# Patient Record
Sex: MALE | Race: OTHER | Employment: OTHER | RURAL
[De-identification: names, ages, dates, MRNs, and addresses within clinical notes are randomized per-mention and may not be internally consistent; named-entity substitution may affect disease eponyms.]

---

## 2017-04-11 ENCOUNTER — OFFICE VISIT (OUTPATIENT)
Dept: CARDIOLOGY CLINIC | Age: 77
End: 2017-04-11

## 2017-04-11 VITALS
HEIGHT: 67 IN | BODY MASS INDEX: 23.01 KG/M2 | DIASTOLIC BLOOD PRESSURE: 70 MMHG | RESPIRATION RATE: 18 BRPM | WEIGHT: 146.6 LBS | SYSTOLIC BLOOD PRESSURE: 110 MMHG | OXYGEN SATURATION: 98 % | HEART RATE: 70 BPM

## 2017-04-11 DIAGNOSIS — I25.10 CORONARY ARTERY DISEASE INVOLVING NATIVE CORONARY ARTERY OF NATIVE HEART WITHOUT ANGINA PECTORIS: Primary | ICD-10-CM

## 2017-04-11 DIAGNOSIS — Z95.1 S/P CABG (CORONARY ARTERY BYPASS GRAFT): ICD-10-CM

## 2017-04-11 DIAGNOSIS — E11.9 TYPE 2 DIABETES MELLITUS WITHOUT COMPLICATION, WITHOUT LONG-TERM CURRENT USE OF INSULIN (HCC): ICD-10-CM

## 2017-04-11 DIAGNOSIS — I49.1 PAC (PREMATURE ATRIAL CONTRACTION): ICD-10-CM

## 2017-04-11 DIAGNOSIS — E78.5 DYSLIPIDEMIA: ICD-10-CM

## 2017-04-11 RX ORDER — ACETAMINOPHEN 325 MG/1
TABLET ORAL AS NEEDED
COMMUNITY

## 2017-04-11 RX ORDER — BISMUTH SUBSALICYLATE 262 MG
1 TABLET,CHEWABLE ORAL DAILY
COMMUNITY
End: 2018-11-02 | Stop reason: ALTCHOICE

## 2017-04-11 RX ORDER — OMEPRAZOLE 40 MG/1
40 CAPSULE, DELAYED RELEASE ORAL AS NEEDED
COMMUNITY

## 2017-04-11 RX ORDER — DIPHENHYDRAMINE HCL 25 MG
25 CAPSULE ORAL AS NEEDED
COMMUNITY

## 2017-04-11 NOTE — MR AVS SNAPSHOT
Visit Information Date & Time Provider Department Dept. Phone Encounter #  
 4/11/2017 11:20 AM Juliann Suresh MD [facility] [phone] [id number] Upcoming Health Maintenance Date Due HEMOGLOBIN A1C Q6M 1938 LIPID PANEL Q1 1938 FOOT EXAM Q1 7/24/1948 MICROALBUMIN Q1 7/24/1948 EYE EXAM RETINAL OR DILATED Q1 7/24/1948 DTaP/Tdap/Td series (1 - Tdap) 7/24/1959 ZOSTER VACCINE AGE 60> 7/24/1998 GLAUCOMA SCREENING Q2Y 7/24/2003 Pneumococcal 65+ Low/Medium Risk (1 of 2 - PCV13) 7/24/2003 MEDICARE YEARLY EXAM 7/24/2003 INFLUENZA AGE 9 TO ADULT 8/1/2016 Allergies as of 4/11/2017  Review Complete On: 4/11/2017 By: Juliann Suresh MD  
  
 Severity Noted Reaction Type Reactions Bactrim [Sulfamethoprim]  07/23/2015    Other (comments) Sulfa (Sulfonamide Antibiotics)  05/20/2014    Rash Current Immunizations  Never Reviewed No immunizations on file. Not reviewed this visit You Were Diagnosed With   
  
 Codes Comments Coronary artery disease involving native coronary artery of native heart without angina pectoris    -  Primary ICD-10-CM: I25.10 ICD-9-CM: 414.01 S/P CABG (coronary artery bypass graft)     ICD-10-CM: Z95.1 ICD-9-CM: V45.81 PAC (premature atrial contraction)     ICD-10-CM: I49.1 ICD-9-CM: 427.61 Dyslipidemia     ICD-10-CM: E78.5 ICD-9-CM: 272.4 Type 2 diabetes mellitus without complication, without long-term current use of insulin (HCC)     ICD-10-CM: E11.9 ICD-9-CM: 250.00 Vitals BP Pulse Resp Height(growth percentile) Weight(growth percentile) SpO2  
 110/70 (BP 1 Location: Left arm, BP Patient Position: Sitting) 70 18 5' 7\" (1.702 m) 146 lb 9.6 oz (66.5 kg) 98% BMI Smoking Status 22.96 kg/m2 Never Smoker Vitals History BMI and BSA Data  Body Mass Index Body Surface Area  
 22.96 kg/m 2 1.77 m 2  
  
  
 Preferred Pharmacy Pharm[facility] Phone [facility] [address] [place] [address] Your Updated Medication List  
  
   
This list is accurate as of: 4/11/17 11:59 AM.  Always use your most recent med list.  
  
  
  
  
 ADVAIR DISKUS 250-50 mcg/dose diskus inhaler Generic drug:  fluticasone-salmeterol Take 1 Puff by inhalation as needed. albuterol 90 mcg/actuation inhaler Commonly known as:  PROVENTIL HFA, VENTOLIN HFA, PROAIR HFA Take  by inhalation as needed for Wheezing. aspirin 81 mg chewable tablet Take 81 mg by mouth daily. atorvastatin 80 mg tablet Commonly known as:  LIPITOR Take 80 mg by mouth daily. BENADRYL 25 mg capsule Generic drug:  diphenhydrAMINE Take 25 mg by mouth as needed. CHLORPHENIRAMINE-PHENYLEPHRINE PO Take  by mouth as needed. metFORMIN 500 mg tablet Commonly known as:  GLUCOPHAGE Take  by mouth two (2) times daily (with meals). multivitamin tablet Commonly known as:  ONE A DAY Take 1 Tab by mouth daily. omeprazole 40 mg capsule Commonly known as:  PRILOSEC Take 40 mg by mouth daily. SITagliptin 50 mg tablet Commonly known as:  Jean-Paul Goodpasture Take 25 mg by mouth daily. SYNTHROID 100 mcg tablet Generic drug:  levothyroxine Take  by mouth Daily (before breakfast). TYLENOL 325 mg tablet Generic drug:  acetaminophen Take  by mouth as needed for Pain. We Performed the Following AMB POC EKG ROUTINE W/ 12 LEADS, INTER & REP [58455 CPT(R)] Introducing [facility] & HEALTH SERVICES! Dear Laya Givens: Thank you for requesting a [employer] account. Our records indicate that you already have an active [employer] account. You can access your account anytime at [email or web address] Did you know that you can access your hospital and ER discharge instructions at any time in [employer]?   You can also review all of your test results from your hospital stay or ER visit. Additional Information If you have questions, please visit the Frequently Asked Questions section of the [employer] website at [email or web address] Remember, [employer] is NOT to be used for urgent needs. For medical emergencies, dial 911. Now available from your iPhone and Android! Please provide this summary of care documentation to your next provider. Your primary care clinician is listed as Phys Other. If you have any questions after today's visit, please call [phone].

## 2017-04-11 NOTE — PROGRESS NOTES
Jeffrey Roblero is a 64 y.o. male is here for routine f/u. Remains physically active, running, etc. No CV sx or complaints. DM/lipids doing well, followed by PCP. No CV sx or complaints. The patient denies chest pain/ shortness of breath, orthopnea, PND, LE edema, palpitations, syncope, presyncope or fatigue. Patient Active Problem List    Diagnosis Date Noted    PVC's (premature ventricular contractions) 07/23/2015    PAC (premature atrial contraction) 07/23/2015    CAD (coronary artery disease) 07/23/2015    S/P CABG (coronary artery bypass graft) 07/23/2015    DM (diabetes mellitus) ([facility] 07/23/2015    Dyslipidemia 07/23/2015      Sheridan Coreas MD  Past Medical History:   Diagnosis Date    CAD (coronary artery disease)     [facility]     Dyslipidemia     Endocrine disease     GERD (gastroesophageal reflux disease)     Heart abnormality     Hypothyroidism     Palpitations     S/P CABG (coronary artery bypass graft) 1999    LIMA to LAD, SVT to OM1, OM2, SVG to PDA    Seizure disorder (HCC)     vs syncope w/u 2007 (CT, Echo, carotids, stress    Spinal stenosis     Sun-damaged skin       Past Surgical History:   Procedure Laterality Date    CARDIAC SURG PROCEDURE UNLIST       Allergies   Allergen Reactions    Bactrim [Sulfamethoprim] Other (comments)    Sulfa (Sulfonamide Antibiotics) Rash      Family History   Problem Relation Age of Onset    Diabetes Mother     Stroke Father       Social History     Social History    Marital status:      Spouse name: N/A    Number of children: N/A    Years of education: N/A     Occupational History    Not on file.      Social History Main Topics    Smoking status: Never Smoker    Smokeless tobacco: Never Used    Alcohol use 0.5 oz/week     1 Glasses of wine per week      Comment: one a day    Drug use: No    Sexual activity: Not on file     Other Topics Concern    Not on file     Social History Narrative Current Outpatient Prescriptions   Medication Sig    omeprazole (PRILOSEC) 40 mg capsule Take 40 mg by mouth daily.  CHLORPHENIRAMINE-PHENYLEPHRINE PO Take  by mouth as needed.  diphenhydrAMINE (BENADRYL) 25 mg capsule Take 25 mg by mouth as needed.  acetaminophen (TYLENOL) 325 mg tablet Take  by mouth as needed for Pain.  multivitamin (ONE A DAY) tablet Take 1 Tab by mouth daily.  sitaGLIPtin (JANUVIA) 50 mg tablet Take 25 mg by mouth daily.  atorvastatin (LIPITOR) 80 mg tablet Take 80 mg by mouth daily.  albuterol (PROVENTIL HFA, VENTOLIN HFA, PROAIR HFA) 90 mcg/actuation inhaler Take  by inhalation as needed for Wheezing.  fluticasone-salmeterol (ADVAIR DISKUS) 250-50 mcg/dose diskus inhaler Take 1 Puff by inhalation as needed.  aspirin 81 mg chewable tablet Take 81 mg by mouth daily.  metFORMIN (GLUCOPHAGE) 500 mg tablet Take  by mouth two (2) times daily (with meals).  levothyroxine (SYNTHROID) 100 mcg tablet Take  by mouth Daily (before breakfast). No current facility-administered medications for this visit. Review of Symptoms:    CONST  No weight change. No fever, chills, sweats    ENT No visual changes, URI sx, sore throat    CV  See HPI   RESP  No cough, or sputum, wheezing. Also see HPI   GI  No abdominal pain or change in bowel habits. No heartburn or dysphagia. No melena or rectal bleeding.   No dysuria, urgency, frequency, hematuria   MSKEL  No joint pain, swelling. No muscle pain. SKIN  No rash or lesions. NEURO  No headache, syncope, or seizure. No weakness, loss of sensation, or paresthesias. PSYCH  No low mood or depression  No anxiety. HE/LYMPH  No easy bruising, abnormal bleeding, or enlarged glands.         Physical ExamPhysical Exam:    Visit Vitals    /70 (BP 1 Location: Left arm, BP Patient Position: Sitting)    Pulse 70    Resp 18    Ht 5' 7\" (1.702 m)    Wt 146 lb 9.6 oz (66.5 kg)    SpO2 98%    BMI 22.96 kg/m2     Gen: NAD  HEENT:  PERRL, throat clear  Neck: no mass or thyromegaly, no JVD   Heart:  Regular,Nl S1S2,  no murmur, gallop or rub.   Lungs:  clear  Abdomen:   Soft, non-tender, bowel sounds are active.   Extremities:  No edema  Pulse: symmetric  Neuro: A&O times 3, WNL      Cardiographics    ECG: NSR, RSR', no acute changes    Labs:   No results found for: NA, K, CL, CO2, AGAP, GLU, BUN, CREA, BUCR, GFRAA, GFRNA, CA, TBIL, TBILI, GPT, SGOT, AP, TP, ALB, GLOB, AGRAT, ALT  No results found for: CPK, CPKX, CPX  No results found for: CHOL, CHOLX, CHLST, CHOLV, 889059, HDL, LDL, DLDL, LDLC, DLDLP, TGL, TGLX, TRIGL, [id number], TRIGP, CHHD, CHHDX  No results found for this or any previous visit. Assessment:         Patient Active Problem List    Diagnosis Date Noted    PVC's (premature ventricular contractions) 07/23/2015    PAC (premature atrial contraction) 07/23/2015    CAD (coronary artery disease) 07/23/2015    S/P CABG (coronary artery bypass graft) 07/23/2015    [facility] 07/23/2015    Dyslipidemia 07/23/2015     Remains physically active, running, etc. No CV sx or complaints. DM/lipids doing well, followed by PCP. No CV sx or complaints. Plan:     Doing well with no adverse cardiac symptoms. Lipids and labs followed by PCP. Continue current care and f/u in 6 months.     Robert Silva MD

## 2017-10-13 ENCOUNTER — OFFICE VISIT (OUTPATIENT)
Dept: CARDIOLOGY CLINIC | Age: 77
End: 2017-10-13

## 2017-10-13 VITALS
HEART RATE: 70 BPM | BODY MASS INDEX: 22.91 KG/M2 | RESPIRATION RATE: 18 BRPM | SYSTOLIC BLOOD PRESSURE: 102 MMHG | HEIGHT: 67 IN | OXYGEN SATURATION: 97 % | DIASTOLIC BLOOD PRESSURE: 72 MMHG | WEIGHT: 146 LBS

## 2017-10-13 DIAGNOSIS — Z95.1 S/P CABG (CORONARY ARTERY BYPASS GRAFT): ICD-10-CM

## 2017-10-13 DIAGNOSIS — I49.1 PAC (PREMATURE ATRIAL CONTRACTION): ICD-10-CM

## 2017-10-13 DIAGNOSIS — I49.3 PVC'S (PREMATURE VENTRICULAR CONTRACTIONS): ICD-10-CM

## 2017-10-13 DIAGNOSIS — I25.10 CORONARY ARTERY DISEASE INVOLVING NATIVE CORONARY ARTERY OF NATIVE HEART WITHOUT ANGINA PECTORIS: Primary | ICD-10-CM

## 2017-10-13 DIAGNOSIS — E78.5 DYSLIPIDEMIA: ICD-10-CM

## 2017-10-13 DIAGNOSIS — E11.9 TYPE 2 DIABETES MELLITUS WITHOUT COMPLICATION, WITHOUT LONG-TERM CURRENT USE OF INSULIN (HCC): ICD-10-CM

## 2017-10-13 NOTE — MR AVS SNAPSHOT
Visit Information Date & Time Provider Department Dept. Phone Encounter #  
 10/13/2017 10:20 AM Gaurav Oneal, [address] [place] [address] [facility]iology [facility] [phone] [id number] Follow-up Instructions Return in about 6 months (around 4/13/2018). Follow-up and Disposition History Upcoming Health Maintenance Date Due HEMOGLOBIN A1C Q6M 1938 LIPID PANEL Q1 1938 FOOT EXAM Q1 7/24/1948 MICROALBUMIN Q1 7/24/1948 EYE EXAM RETINAL OR DILATED Q1 7/24/1948 DTaP/Tdap/Td series (1 - Tdap) 7/24/1959 ZOSTER VACCINE AGE 60> 5/24/1998 GLAUCOMA SCREENING Q2Y 7/24/2003 Pneumococcal 65+ Low/Medium Risk (1 of 2 - PCV13) 7/24/2003 MEDICARE YEARLY EXAM 7/24/2003 INFLUENZA AGE 9 TO ADULT 8/1/2017 Allergies as of 10/13/2017  Review Complete On: 10/13/2017 By: Gaurav Oneal MD  
  
 Severity Noted Reaction Type Reactions Bactrim [Sulfamethoprim]  07/23/2015    Other (comments) Sulfa (Sulfonamide Antibiotics)  05/20/2014    Rash Current Immunizations  Never Reviewed No immunizations on file. Not reviewed this visit You Were Diagnosed With   
  
 Codes Comments Coronary artery disease involving native coronary artery of native heart without angina pectoris    -  Primary ICD-10-CM: I25.10 ICD-9-CM: 414.01 S/P CABG (coronary artery bypass graft)     ICD-10-CM: Z95.1 ICD-9-CM: V45.81 PVC's (premature ventricular contractions)     ICD-10-CM: I49.3 ICD-9-CM: 427.69 PAC (premature atrial contraction)     ICD-10-CM: I49.1 ICD-9-CM: 427.61 Type 2 diabetes mellitus without complication, without long-term current use of insulin (HCC)     ICD-10-CM: E11.9 ICD-9-CM: 250.00 Dyslipidemia     ICD-10-CM: E78.5 ICD-9-CM: 272.4 Vitals  BP Pulse Resp Height(growth percentile) Weight(growth percentile) SpO2  
 102/72 (BP 1 Location: Left arm, BP Patient Position: Sitting) 70 18 5' 7\" (1.702 m) 146 lb (66.2 kg) 97% BMI Smoking Status 22.87 kg/m2 Never Smoker Vitals History BMI and BSA Data Body Mass Index Body Surface Area  
 22.87 kg/m 2 1.77 m 2 Preferred Pharmacy Pharmacy Name Phone [facility] [address] [facility], [address] [place] [address] Street  Your Updated Medication List  
  
   
This list is accurate as of: 10/13/17 11:26 AM.  Always use your most recent med list.  
  
  
  
  
 Dhara Friend 250-50 mcg/dose diskus inhaler Generic drug:  fluticasone-salmeterol Take 1 Puff by inhalation as needed. albuterol 90 mcg/actuation inhaler Commonly known as:  PROVENTIL HFA, VENTOLIN HFA, PROAIR HFA Take  by inhalation as needed for Wheezing. aspirin 81 mg chewable tablet Take 81 mg by mouth daily. atorvastatin 80 mg tablet Commonly known as:  LIPITOR Take 80 mg by mouth daily. BENADRYL 25 mg capsule Generic drug:  diphenhydrAMINE Take 25 mg by mouth as needed. metFORMIN 500 mg tablet Commonly known as:  GLUCOPHAGE Take  by mouth two (2) times daily (with meals). multivitamin tablet Commonly known as:  ONE A DAY Take 1 Tab by mouth daily. omeprazole 40 mg capsule Commonly known as:  PRILOSEC Take 40 mg by mouth daily. SITagliptin 50 mg tablet Commonly known as:  Carmen Pierini Take 50 mg by mouth daily. SYNTHROID 100 mcg tablet Generic drug:  levothyroxine Take  by mouth Daily (before breakfast). TYLENOL 325 mg tablet Generic drug:  acetaminophen Take  by mouth as needed for Pain. We Performed the Following AMB POC EKG ROUTINE W/ 12 LEADS, INTER & REP [49759 CPT(R)] Follow-up Instructions Return in about 6 months (around 4/13/2018). To-Do List   
 Around 10/17/2017 ECG:  STRESS TEST CARDIAC Introducing [facility] & HEALTH SERVICES! Dear Duc Beltrán: Thank you for requesting a [employer] account. Our records indicate that you already have an active [employer] account. You can access your account anytime at [email or web address] Did you know that you can access your hospital and ER discharge instructions at any time in [employer]? You can also review all of your test results from your hospital stay or ER visit. Additional Information If you have questions, please visit the Frequently Asked Questions section of the [employer] website at [email or web address] Remember, [employer] is NOT to be used for urgent needs. For medical emergencies, dial 911. Now available from your iPhone and Android! Please provide this summary of care documentation to your next provider. Your primary care clinician is listed as Phys Other. If you have any questions after today's visit, please call [phone].

## 2017-10-13 NOTE — PROGRESS NOTES
Verified patient with two patient identifiers. Medications reviewed/approved by Dr. Zheng Rodriges. Verbal from Dr. Zheng Rodriges to remove the medications that were deleted during the visit.

## 2017-10-13 NOTE — PROGRESS NOTES
Lacy Davila is a 76 y.o. male is here for routine f/u. Hx CAD, s/p CABG x4 1999, DM, dyslipidemia with recent sx of palpitations over past month. Holter with PAC's, PVC's, short runs (no afib or block). Stress MPI 9/15 with no infarct/ischemia, LVEF 53%. Echo 8/15 with LVEF 60-65, valves ok (mild MR). No CV sx or complaints. Physically active--runs, etc.  DM and lipids followed by PCP. The patient denies chest pain/ shortness of breath, orthopnea, PND, LE edema, palpitations, syncope, presyncope or fatigue. Patient Active Problem List    Diagnosis Date Noted    PVC's (premature ventricular contractions) 07/23/2015    PAC (premature atrial contraction) 07/23/2015    CAD (coronary artery disease) 07/23/2015    S/P CABG (coronary artery bypass graft) 07/23/2015    DM (di[facility]) ([facility] 07/23/2015    Dyslipidemia 07/23/2015      Sheridan Coreas MD  Past Medical History:   Diagnosis Date    CAD (coronary artery disease)     DM (diabetes mellitus) ([facility]     Dyslipidemia     Endocrine disease     GERD (gastroesophageal reflux disease)     Heart abnormality     Hypothyroidism     Palpitations     S/P CABG (coronary artery bypass graft) 1999    LIMA to LAD, SVT to OM1, OM2, SVG to PDA    Seizure disorder (HCC)     vs syncope w/u 2007 (CT, Echo, carotids, stress    Spinal stenosis     Sun-damaged skin       Past Surgical History:   Procedure Laterality Date    CARDIAC SURG PROCEDURE UNLIST       Allergies   Allergen Reactions    Bactrim [Sulfamethoprim] Other (comments)    Sulfa (Sulfonamide Antibiotics) Rash      Family History   Problem Relation Age of Onset    Diabetes Mother     Stroke Father       Social History     Social History    Marital status:      Spouse name: N/A    Number of children: N/A    Years of education: N/A     Occupational History    Not on file.      Social History Main Topics    Smoking status: Never Smoker    Smokeless tobacco: Never Used    Alcohol use 0.5 oz/week     1 Glasses of wine per week      Comment: one a day    Drug use: No    Sexual activity: Not on file     Other Topics Concern    Not on file     Social History Narrative      Current Outpatient Prescriptions   Medication Sig    omeprazole (PRILOSEC) 40 mg capsule Take 40 mg by mouth daily.  diphenhydrAMINE (BENADRYL) 25 mg capsule Take 25 mg by mouth as needed.  acetaminophen (TYLENOL) 325 mg tablet Take  by mouth as needed for Pain.  multivitamin (ONE A DAY) tablet Take 1 Tab by mouth daily.  sitaGLIPtin (JANUVIA) 50 mg tablet Take 50 mg by mouth daily.  atorvastatin (LIPITOR) 80 mg tablet Take 80 mg by mouth daily.  albuterol (PROVENTIL HFA, VENTOLIN HFA, PROAIR HFA) 90 mcg/actuation inhaler Take  by inhalation as needed for Wheezing.  fluticasone-salmeterol (ADVAIR DISKUS) 250-50 mcg/dose diskus inhaler Take 1 Puff by inhalation as needed.  aspirin 81 mg chewable tablet Take 81 mg by mouth daily.  metFORMIN (GLUCOPHAGE) 500 mg tablet Take  by mouth two (2) times daily (with meals).  levothyroxine (SYNTHROID) 100 mcg tablet Take  by mouth Daily (before breakfast). No current facility-administered medications for this visit. Review of Symptoms:    CONST  No weight change. No fever, chills, sweats    ENT No visual changes, URI sx, sore throat    CV  See HPI   RESP  No cough, or sputum, wheezing. Also see HPI   GI  No abdominal pain or change in bowel habits. No heartburn or dysphagia. No melena or rectal bleeding.   No dysuria, urgency, frequency, hematuria   MSKEL  No joint pain, swelling. No muscle pain. SKIN  No rash or lesions. NEURO  No headache, syncope, or seizure. No weakness, loss of sensation, or paresthesias. PSYCH  No low mood or depression  No anxiety. HE/LYMPH  No easy bruising, abnormal bleeding, or enlarged glands.         Physical ExamPhysical Exam:    Visit Vitals    /72 (BP 1 Location: Left arm, BP Patient Position: Sitting)    Pulse 70    Resp 18    Ht 5' 7\" (1.702 m)    Wt 146 lb (66.2 kg)    SpO2 97%    BMI 22.87 kg/m2     Gen: NAD  HEENT:  PERRL, throat clear  Neck: no adenopathy, no thyromegaly, no JVD   Heart:  Regular,Nl S1S2,  no murmur, gallop or rub.   Lungs:  Clear healed sternotomy  Abdomen:   Soft, non-tender, bowel sounds are active.   Extremities:  No edema  Pulse: symmetric  Neuro: A&O times 3, No focal neuro deficits    Cardiographics    ECG: NSR, RSR', NST, no changes    CARDIAC TESTING:    ECHO 8/10/15: Normal LV size/wall thickness, LVEF 60-65, mild MR, PASP<35    STRESS MYOVIEW: 8/10/15 no ischemia/infarct, LVEF 53%      Assessment:         Patient Active Problem List    Diagnosis Date Noted    PVC's (premature ventricular contractions) 07/23/2015    PAC (premature atrial contraction) 07/23/2015    CAD (coronary artery disease) 07/23/2015    S/P CABG (coronary artery bypass graft) 07/23/2015    DM (diabetes mellitus) ([facility] 07/23/2015    Dyslipidemia 07/23/2015     Hx CAD, s/p CABG x4 1999, DM, dyslipidemia with recent sx of palpitations over past month. Holter with PAC's, PVC's, short runs (no afib or block). Stress MPI 9/15 with no infarct/ischemia, LVEF 53%. Echo 8/15 with LVEF 60-65, mild MR, otherwise ok. No CV sx or complaints. Physically active--runs, etc.  DM and lipids followed by PCP. Plan:     Doing well with no adverse cardiac symptoms. Last Stress test 2015--plan repeat Exercise stress treadmill--call w/ results. Lipids and labs followed by PCP. Continue current care and f/u in 6 months.     Radu Mathis MD

## 2018-04-27 ENCOUNTER — OFFICE VISIT (OUTPATIENT)
Dept: CARDIOLOGY CLINIC | Age: 78
End: 2018-04-27

## 2018-04-27 VITALS
SYSTOLIC BLOOD PRESSURE: 128 MMHG | HEART RATE: 80 BPM | HEIGHT: 67 IN | BODY MASS INDEX: 22.76 KG/M2 | WEIGHT: 145 LBS | OXYGEN SATURATION: 95 % | RESPIRATION RATE: 14 BRPM | DIASTOLIC BLOOD PRESSURE: 74 MMHG

## 2018-04-27 DIAGNOSIS — I25.10 CORONARY ARTERY DISEASE INVOLVING NATIVE CORONARY ARTERY OF NATIVE HEART WITHOUT ANGINA PECTORIS: Primary | ICD-10-CM

## 2018-04-27 DIAGNOSIS — I49.3 PVC'S (PREMATURE VENTRICULAR CONTRACTIONS): ICD-10-CM

## 2018-04-27 DIAGNOSIS — E78.5 DYSLIPIDEMIA: ICD-10-CM

## 2018-04-27 DIAGNOSIS — I49.1 PAC (PREMATURE ATRIAL CONTRACTION): ICD-10-CM

## 2018-04-27 DIAGNOSIS — Z95.1 S/P CABG (CORONARY ARTERY BYPASS GRAFT): ICD-10-CM

## 2018-04-27 DIAGNOSIS — E11.9 TYPE 2 DIABETES MELLITUS WITHOUT COMPLICATION, WITHOUT LONG-TERM CURRENT USE OF INSULIN (HCC): ICD-10-CM

## 2018-04-27 RX ORDER — DOXYCYCLINE 100 MG/1
100 CAPSULE ORAL 2 TIMES DAILY
COMMUNITY
End: 2018-11-02 | Stop reason: ALTCHOICE

## 2018-04-27 NOTE — MR AVS SNAPSHOT
[address] 
 
 
 [address] [facility] [id number] [phone] Patient: Eda Whelan MRN: [id number] Visit Information Date & Time Provider Department Dept. Phone Encounter #  
 4/27/2018 10:40 AM Benjamin Hall, [address] [facility] [id number] Your Appointments 11/2/2018 10:40 AM  
ESTABLISHED PATIENT with Benjamin Hall MD  
[address] [facility]) Appt Note: 6 mo fu $0cp [address] [facility] [address] [id number] [phone]  
  
   
 [address] [place] [address] Upcoming Health Maintenance Date Due HEMOGLOBIN A1C Q6M 1940 LIPID PANEL Q1 1938 FOOT EXAM Q1 7/24/1948 MICROALBUMIN Q1 7/24/1948 EYE EXAM RETINAL OR DILATED Q1 7/24/1948 DTaP/Tdap/Td series (1 - Tdap) 7/24/1959 ZOSTER VACCINE AGE 60> 5/24/1998 GLAUCOMA SCREENING Q2Y 7/24/2003 Pneumococcal 65+ Low/Medium Risk (1 of 2 - PCV13) 7/24/2003 MEDICARE YEARLY EXAM 3/14/2018 Influenza Age 5 to Adult 8/1/2018 Allergies as of 4/27/2018  Review Complete On: 4/27/2018 By: Benjamin Hall MD  
  
 Severity Noted Reaction Type Reactions Bactrim [Sulfamethoprim]  07/23/2015    Other (comments) Sulfa (Sulfonamide Antibiotics)  05/20/2014    Rash Current Immunizations  Never Reviewed No immunizations on file. Not reviewed this visit You Were Diagnosed With   
  
 Codes Comments Coronary artery disease involving native coronary artery of native heart without angina pectoris    -  Primary ICD-10-CM: I25.10 ICD-9-CM: 414.01   
 PVC's (premature ventricular contractions)     ICD-10-CM: I49.3 ICD-9-CM: 427.69 PAC (premature atrial contraction)     ICD-10-CM: I49.1 ICD-9-CM: 427.61 Dyslipidemia     ICD-10-CM: E78.5 ICD-9-CM: 272.4 S/P CABG (coronary artery bypass graft)     ICD-10-CM: Z95.1 ICD-9-CM: V45.81 Type 2 diabetes mellitus without complication, without long-term current use of insulin (HCC)     ICD-10-CM: E11.9 ICD-9-CM: 250.00 Vitals BP Pulse Resp Height(growth percentile) Weight(growth percentile) SpO2  
 128/74 (BP 1 Location: Right arm, BP Patient Position: Sitting) 80 14 5' 7\" (1.702 m) 145 lb (65.8 kg) 95% BMI Smoking Status 22.71 kg/m2 Never Smoker BMI and BSA Data Body Mass Index Body Surface Area  
 22.71 kg/m 2 1.76 m 2 Preferred Pharmacy Pharmacy Name Phone [facility] [id number] [facility], [address] [place] [address] Your Updated Medication List  
  
   
This list is accurate as of 4/27/18 11:07 AM.  Always use your most recent med list.  
  
  
  
  
 Lenward Leaver 250-50 mcg/dose diskus inhaler Generic drug:  fluticasone-salmeterol Take 1 Puff by inhalation as needed. albuterol 90 mcg/actuation inhaler Commonly known as:  PROVENTIL HFA, VENTOLIN HFA, PROAIR HFA Take  by inhalation as needed for Wheezing. aspirin 81 mg chewable tablet Take 81 mg by mouth daily. atorvastatin 80 mg tablet Commonly known as:  LIPITOR Take 80 mg by mouth daily. BENADRYL 25 mg capsule Generic drug:  diphenhydrAMINE Take 25 mg by mouth as needed. doxycycline 100 mg capsule Commonly known as:  Arlyss Mantel Take 100 mg by mouth two (2) times a day. metFORMIN 500 mg tablet Commonly known as:  GLUCOPHAGE Take  by mouth two (2) times daily (with meals). multivitamin tablet Commonly known as:  ONE A DAY Take 1 Tab by mouth daily. omeprazole 40 mg capsule Commonly known as:  PRILOSEC Take 40 mg by mouth every Monday, Wednesday, Friday. SITagliptin 50 mg tablet Commonly known as:  Jacki Seat Take 50 mg by mouth daily. SYNTHROID 100 mcg tablet Generic drug:  levothyroxine Take  by mouth Daily (before breakfast). TYLENOL 325 mg tablet Generic drug:  acetaminophen Take  by mouth as needed for Pain. We Performed the Following AMB POC EKG ROUTINE W/ 12 LEADS, INTER & REP [84766 CPT(R)] Introducing [facility] [place][note 2]! Dear Moreno Boston: Thank you for requesting a [employer] account. Our records indicate that you already have an active [employer] account. You can access your account anytime at [email or web address] Did you know that you can access your hospital and ER discharge instructions at any time in [employer]izer? You can also review all of your test results from your hospital stay or ER visit. Additional Information If you have questions, please visit the Frequently Asked Questions section of the [employer] website at [email or web address] Remember, Gre[employer]izer is NOT to be used for urgent needs. For medical emergencies, dial 911. Now available from your iPhone and Android! Please provide this summary of care documentation to your next provider. Your primary care clinician is listed as Phys Other. If you have any questions after today's visit, please call [phone].

## 2018-04-27 NOTE — PROGRESS NOTES
Daija Renteria is a 76 y.o. male is here for routine f/u. Hx CAD, s/p CABG x4 1999, DM, dyslipidemia with recent sx of palpitations over past month.  Holter with PAC's, PVC's, short runs (no afib or block). Stress MPI 9/15 with no infarct/ischemia, LVEF 53%. Echo 8/15 with LVEF 60-65, valves ok (mild MR). Stress treadmill EKG 10/17 was normal.  No CV sx or complaints. Physically active--runs, etc.  DM and lipids followed by PCP. The patient denies chest pain/ shortness of breath, orthopnea, PND, LE edema, palpitations, syncope, presyncope or fatigue. Patient Active Problem List    Diagnosis Date Noted    PVC's (premature ventricular contractions) 07/23/2015    PAC (premature atrial contraction) 07/23/2015    CAD (coronary artery disease) 07/23/2015    S/P CABG (coronary artery bypass graft) 07/23/2015    DM [facility]diabetes mellitus) ([facility] 07/23/2015    Dyslipidemia 07/23/2015      Sheridan Coreas MD  Past Medical History:   Diagnosis Date    CAD (coronary artery disease)     DM [facility]diabetes mellitus) ([facility]     Dyslipidemia     Endocrine disease     GERD (gastroesophageal reflux disease)     Heart abnormality     Hypothyroidism     Palpitations     S/P CABG (coronary artery bypass graft) 1999    LIMA to LAD, SVT to OM1, OM2, SVG to PDA    Seizure disorder (HCC)     vs syncope w/u 2007 (CT, Echo, carotids, stress    Spinal stenosis     Sun-damaged skin       Past Surgical History:   Procedure Laterality Date    CARDIAC SURG PROCEDURE UNLIST       Allergies   Allergen Reactions    Bactrim [Sulfamethoprim] Other (comments)    Sulfa (Sulfonamide Antibiotics) Rash      Family History   Problem Relation Age of Onset    Diabetes Mother     Stroke Father       Social History     Social History    Marital status:      Spouse name: N/A    Number of children: N/A    Years of education: N/A     Occupational History    Not on file.      Social History Main Topics    Smoking status: Never Smoker    Smokeless tobacco: Never Used    Alcohol use 0.5 oz/week     1 Glasses of wine per week      Comment: one a day    Drug use: No    Sexual activity: Not on file     Other Topics Concern    Not on file     Social History Narrative      Current Outpatient Prescriptions   Medication Sig    doxycycline (MONODOX) 100 mg capsule Take 100 mg by mouth two (2) times a day.  omeprazole (PRILOSEC) 40 mg capsule Take 40 mg by mouth every Monday, Wednesday, Friday.  diphenhydrAMINE (BENADRYL) 25 mg capsule Take 25 mg by mouth as needed.  acetaminophen (TYLENOL) 325 mg tablet Take  by mouth as needed for Pain.  multivitamin (ONE A DAY) tablet Take 1 Tab by mouth daily.  sitaGLIPtin (JANUVIA) 50 mg tablet Take 50 mg by mouth daily.  atorvastatin (LIPITOR) 80 mg tablet Take 80 mg by mouth daily.  albuterol (PROVENTIL HFA, VENTOLIN HFA, PROAIR HFA) 90 mcg/actuation inhaler Take  by inhalation as needed for Wheezing.  fluticasone-salmeterol (ADVAIR DISKUS) 250-50 mcg/dose diskus inhaler Take 1 Puff by inhalation as needed.  aspirin 81 mg chewable tablet Take 81 mg by mouth daily.  metFORMIN (GLUCOPHAGE) 500 mg tablet Take  by mouth two (2) times daily (with meals).  levothyroxine (SYNTHROID) 100 mcg tablet Take  by mouth Daily (before breakfast). No current facility-administered medications for this visit. Review of Symptoms:    CONST  No weight change. No fever, chills, sweats    ENT No visual changes, URI sx, sore throat    CV  See HPI   RESP  No cough, or sputum, wheezing. Also see HPI   GI  No abdominal pain or change in bowel habits. No heartburn or dysphagia. No melena or rectal bleeding.   No dysuria, urgency, frequency, hematuria   MSKEL  No joint pain, swelling. No muscle pain. SKIN  No rash or lesions. NEURO  No headache, syncope, or seizure. No weakness, loss of sensation, or paresthesias. PSYCH  No low mood or depression  No anxiety. HE/LYMPH  No easy bruising, abnormal bleeding, or enlarged glands. Physical ExamPhysical Exam:    Visit Vitals    /74 (BP 1 Location: Right arm, BP Patient Position: Sitting)    Pulse 80    Resp 14    Ht 5' 7\" (1.702 m)    Wt 145 lb (65.8 kg)    SpO2 95%    BMI 22.71 kg/m2     Gen: NAD  HEENT:  PERRL, throat clear  Neck: no adenopathy, no thyromegaly, no JVD   Heart:  Regular,Nl S1S2,  no murmur, gallop or rub.   Lungs:  Clear healed sternotomy  Abdomen:   Soft, non-tender, bowel sounds are active.   Extremities:  No edema  Pulse: symmetric  Neuro: A&O times 3, No focal neuro deficits    Cardiographics    ECG: NSR, RSR', NST, no change from previous      Assessment:         Patient Active Problem List    Diagnosis Date Noted    PVC's (premature ventricular contractions) 07/23/2015    PAC (premature atrial contraction) 07/23/2015    CAD (coronary artery disease) 07/23/2015    S/P CABG (coronary artery bypass graft) 07/23/2015    DM (diabetes mellitus) ([facility] 07/23/2015    Dyslipidemia 07/23/2015     Hx CAD, s/p CABG x4 1999, DM, dyslipidemia with recent sx of palpitations over past month.  Holter with PAC's, PVC's, short runs (no afib or block). Stress MPI 9/15 with no infarct/ischemia, LVEF 53%. Echo 8/15 with LVEF 60-65, valves ok (mild MR). Stress treadmill EKG 10/17 was normal.  No CV sx or complaints. Physically active--runs, etc.  DM and lipids followed by PCP. Plan:     Doing well with no adverse cardiac symptoms. Lipids and labs followed by PCP. Continue current care and f/u in 6 months.     Malgorzata Higgins MD

## 2018-04-27 NOTE — PROGRESS NOTES
PATIENT ID VERIFIED WITH TWO PATIENT IDENTIFIERS. PATIENT MEDICATIONS REVIEWED AND APPROVED BY DR. Catherine Lama. MEDICATIONS THAT WERE REMOVED FROM THIS VISIT HAVE BEEN APPROVED BY DR. Catherine Lama. Chief Complaint   Patient presents with    Coronary Artery Disease     6 MO F/U    Irregular Heart Beat    Cholesterol Problem        1. Have you been to the ER, urgent care clinic since your last visit? Hospitalized since your last visit? NO    2. Have you seen or consulted any other health care providers outside of the [address] [place] [address] since your last visit? YES PCP Dr. Isael Smith with [place], Dermatology-[facility] Galarza.  [facility]

## 2018-11-02 ENCOUNTER — OFFICE VISIT (OUTPATIENT)
Dept: CARDIOLOGY CLINIC | Age: 78
End: 2018-11-02

## 2018-11-02 VITALS
DIASTOLIC BLOOD PRESSURE: 70 MMHG | SYSTOLIC BLOOD PRESSURE: 134 MMHG | OXYGEN SATURATION: 96 % | HEIGHT: 67 IN | RESPIRATION RATE: 12 BRPM | WEIGHT: 145 LBS | HEART RATE: 85 BPM | BODY MASS INDEX: 22.76 KG/M2

## 2018-11-02 DIAGNOSIS — I49.3 PVC'S (PREMATURE VENTRICULAR CONTRACTIONS): ICD-10-CM

## 2018-11-02 DIAGNOSIS — I49.9 IRREGULAR HEARTBEAT: ICD-10-CM

## 2018-11-02 DIAGNOSIS — Z95.1 S/P CABG (CORONARY ARTERY BYPASS GRAFT): ICD-10-CM

## 2018-11-02 DIAGNOSIS — E78.5 DYSLIPIDEMIA: ICD-10-CM

## 2018-11-02 DIAGNOSIS — I25.10 CORONARY ARTERY DISEASE INVOLVING NATIVE CORONARY ARTERY OF NATIVE HEART WITHOUT ANGINA PECTORIS: Primary | ICD-10-CM

## 2018-11-02 DIAGNOSIS — E11.9 TYPE 2 DIABETES MELLITUS WITHOUT COMPLICATION, WITHOUT LONG-TERM CURRENT USE OF INSULIN (HCC): ICD-10-CM

## 2018-11-02 RX ORDER — FLUOCINONIDE 0.5 MG/G
CREAM TOPICAL AS NEEDED
COMMUNITY

## 2018-11-02 NOTE — PROGRESS NOTES
Mely Dowell is a [de-identified] y.o. male is here for routine f/u. Hx CAD, s/p CABG x4 1999, DM, dyslipidemia with recent sx of palpitations over past month.  Holter with PAC's, PVC's, short runs (no afib or block).  Stress MPI 9/15 with no infarct/ischemia, LVEF 53%.  Echo 8/15 with LVEF 60-65, valves ok (mild MR). Stress treadmill EKG 10/17 was normal.  No CV sx or complaints.  Physically active--runs, etc.  DM and lipids followed by PCP. No CV sx or complaints. Continues to be physically active, running, etc.  The patient denies chest pain/ shortness of breath, orthopnea, PND, LE edema, palpitations, syncope, presyncope or fatigue. Patient Active Problem List  
 Diagnosis Date Noted  PVC's (premature ventricular contractions) 07/23/2015  PAC (premature atrial contraction) 07/23/2015  CAD (coronary artery disease) 07/23/2015  S/P CABG (coronary artery bypass graft) 07/23/2015  DM (diabetes mellitus) ([facility] [facility] 07/23/2015  Dyslipidemia 07/23/2015 Other, MD Sheridan 
Past Medical History:  
Diagnosis Date  CAD (coronary artery disease)  DM (diabetes mellitus) ([facility]  Dyslipidemia  Endocrine disease  GERD (gastroesophageal reflux disease)  Heart abnormality  Hypothyroidism  Palpitations  S/P CABG (coronary artery bypass graft) 1999 LIMA to LAD, SVT to OM1, OM2, SVG to PDA  Seizure disorder ([facility]   
 vs syncope w/u 2007 (CT, Echo, carotids, stress  Spinal stenosis  Sun-damaged skin Past Surgical History:  
Procedure Laterality Date  CARDIAC SURG PROCEDURE UNLIST Allergies Allergen Reactions  Bactrim [Sulfamethoprim] Other (comments)  Sulfa (Sulfonamide Antibiotics) Rash Family History Problem Relation Age of Onset  Diabetes Mother  Stroke Father Social History Socioeconomic History  Marital status:  Spouse name: Not on file  Number of children: Not on file  Years of education: Not on file  Highest education level: Not on file Social Needs  Financial resource strain: Not on file  Food insecurity - worry: Not on file  Food insecurity - inability: Not on file  Transportation needs - medical: Not on file  Transportation needs - non-medical: Not on file Occupational History  Not on file Tobacco Use  Smoking status: Never Smoker  Smokeless tobacco: Never Used Substance and Sexual Activity  Alcohol use: Yes Alcohol/week: 0.5 oz Types: 1 Glasses of wine per week Comment: one a day  Drug use: No  
 Sexual activity: Not on file Other Topics Concern  Not on file Social History Narrative  Not on file Current Outpatient Medications Medication Sig  
 omeprazole (PRILOSEC) 40 mg capsule Take 40 mg by mouth every Monday, Wednesday, Friday.  diphenhydrAMINE (BENADRYL) 25 mg capsule Take 25 mg by mouth as needed.  acetaminophen (TYLENOL) 325 mg tablet Take  by mouth as needed for Pain.  sitaGLIPtin (JANUVIA) 50 mg tablet Take 50 mg by mouth daily.  atorvastatin (LIPITOR) 80 mg tablet Take 80 mg by mouth daily.  albuterol (PROVENTIL HFA, VENTOLIN HFA, PROAIR HFA) 90 mcg/actuation inhaler Take  by inhalation as needed for Wheezing.  fluticasone-salmeterol (ADVAIR DISKUS) 250-50 mcg/dose diskus inhaler Take 1 Puff by inhalation as needed.  aspirin 81 mg chewable tablet Take 81 mg by mouth daily.  metFORMIN (GLUCOPHAGE) 500 mg tablet Take  by mouth two (2) times daily (with meals).  levothyroxine (SYNTHROID) 100 mcg tablet Take  by mouth Daily (before breakfast). No current facility-administered medications for this visit. Review of Symptoms: 
 
CONST  No weight change. No fever, chills, sweats ENT No visual changes, URI sx, sore throat CV  See HPI  
RESP  No cough, or sputum, wheezing. Also see HPI  
GI  No abdominal pain or change in bowel habits. No heartburn or dysphagia. No melena or rectal bleeding.   No dysuria, urgency, frequency, hematuria MSKEL  No joint pain, swelling. No muscle pain. SKIN  No rash or lesions. NEURO  No headache, syncope, or seizure. No weakness, loss of sensation, or paresthesias. PSYCH  No low mood or depression No anxiety. HE/LYMPH  No easy bruising, abnormal bleeding, or enlarged glands. Physical ExamPhysical Exam:   
Visit Vitals /70 (BP 1 Location: Right arm, BP Patient Position: Sitting) Pulse 85 Resp 12 Ht 5' 7\" (1.702 m) Wt 145 lb (65.8 kg) SpO2 96% BMI 22.71 kg/m² Gen: NAD HEENT:  PERRL, throat clear Neck: no adenopathy, no thyromegaly, no JVD Heart:  Regular,Nl S1S2,  I/VI murmur, no gallop or rub.  
Lungs:  Clear healed sternotomy Abdomen:   Soft, non-tender, bowel sounds are active.  
Extremities:  No edema Pulse: symmetric Neuro: A&O times 3, No focal neuro deficits Cardiographics ECG: NSR, RSR', NST, no acute changes Assessment:  
  
  
Patient Active Problem List  
 Diagnosis Date Noted  PVC's (premature ventricular contractions) 07/23/2015  PAC (premature atrial contraction) 07/23/2015  CAD (coronary artery disease) 07/23/2015  S/P CABG (coronary artery bypass graft) 07/23/2015  DM (diabetes mellitus) ([facility] [address] 07/23/2015  Dyslipidemia 07/23/2015 Hx CAD, s/p CABG x4 1999, DM, dyslipidemia with recent sx of palpitations over past month.  Holter with PAC's, PVC's, short runs (no afib or block).  Stress MPI 9/15 with no infarct/ischemia, LVEF 53%.  Echo 8/15 with LVEF 60-65, valves ok (mild MR). Stress treadmill EKG 10/17 was normal.  No CV sx or complaints.  Physically active--runs, etc.  DM and lipids followed by PCP. No CV sx or complaints. Continues to be physically active, running, etc.   
 
 Plan:  
 
Doing well with no adverse cardiac symptoms.   Lipids and labs followed by PCP.  Continue current care and f/u in 6 months.  
 
Jinny Donaldson MD

## 2018-11-02 NOTE — PROGRESS NOTES
PATIENT ID VERIFIED WITH TWO PATIENT IDENTIFIERS. PATIENT MEDICATIONS REVIEWED AND APPROVED BY DR. Orlin Gross. MEDICATIONS THAT WERE REMOVED FROM THIS VISIT HAVE BEEN APPROVED BY DR. Orlin Gross. MEDICATIONS REMOVED:  MONODOX AND MULTIVITAMIN Chief Complaint Patient presents with  Coronary Artery Disease 6 MO F/U  
 Irregular Heart Beat  Cholesterol Problem 1. Have you been to the ER, urgent care clinic since your last visit? Hospitalized since your last visit? NO 
 
 
2. Have you seen or consulted any other health care providers outside of the [facility] since your last visit? Include any pap smears or colon screening. [facility] Simón Grayson August 2018 for routine f/u, [place] physician-ENT this week for vertigo

## 2019-03-22 ENCOUNTER — TELEPHONE (OUTPATIENT)
Dept: CARDIOLOGY CLINIC | Age: 79
End: 2019-03-22

## 2019-03-22 NOTE — TELEPHONE ENCOUNTER
Please call patient he feels find has started taking a medication and he is having more Arrhythmia  could medication be causing this. [phone].     Thanks  Dawson Coles

## 2019-03-26 ENCOUNTER — TELEPHONE (OUTPATIENT)
Dept: CARDIOLOGY CLINIC | Age: 79
End: 2019-03-26

## 2019-03-26 NOTE — TELEPHONE ENCOUNTER
PER DR KERN:  Rare side effect from flomax--atrial arrhythmias/PAC's and even afib.  Could consider stopping and seeing if this changes--talk to PCP or Urology if wants to try alternate therapy.     Thanks [address] [place] [address] with the patient. Verified patient with two patient identifiers. DR. Ayo Nielson response given and questions answered. Patient verbalized understanding.

## 2019-03-26 NOTE — TELEPHONE ENCOUNTER
Pt states that was a old message and no further questions at this time. Verified patient with two patient identifiers.

## 2019-05-08 ENCOUNTER — OFFICE VISIT (OUTPATIENT)
Dept: CARDIOLOGY CLINIC | Age: 79
End: 2019-05-08

## 2019-05-08 VITALS
HEIGHT: 67 IN | WEIGHT: 143 LBS | DIASTOLIC BLOOD PRESSURE: 66 MMHG | BODY MASS INDEX: 22.44 KG/M2 | SYSTOLIC BLOOD PRESSURE: 112 MMHG | OXYGEN SATURATION: 98 % | RESPIRATION RATE: 16 BRPM | HEART RATE: 66 BPM

## 2019-05-08 DIAGNOSIS — I49.3 PVC'S (PREMATURE VENTRICULAR CONTRACTIONS): Primary | ICD-10-CM

## 2019-05-08 DIAGNOSIS — Z95.1 S/P CABG (CORONARY ARTERY BYPASS GRAFT): ICD-10-CM

## 2019-05-08 DIAGNOSIS — I49.1 PAC (PREMATURE ATRIAL CONTRACTION): ICD-10-CM

## 2019-05-08 DIAGNOSIS — I25.10 CORONARY ARTERY DISEASE INVOLVING NATIVE CORONARY ARTERY OF NATIVE HEART WITHOUT ANGINA PECTORIS: ICD-10-CM

## 2019-05-08 NOTE — PROGRESS NOTES
Verified patient with two patient identifiers. Medications reviewed/approved by Dr. Zheng Rodriges. A verbal from Dr. Zheng Rodriges was given to remove any medications that were deleted during the visit. Medication(s) removed:  None    Chief Complaint   Patient presents with    Coronary Artery Disease     6 month follow up    Irregular Heart Beat    Cholesterol Problem     1. Have you been to the ER, urgent care clinic since your last visit? Hospitalized since your last visit?no  2. Have you seen or consulted any other health care providers outside of the [address] [place] [address] since your last visit? Include any pap smears or colon screening.yes, Dr. Montserrat Duckworth last seen in 11/2018 for routine care.

## 2019-05-08 NOTE — PROGRESS NOTES
Ana Pink is a [de-identified] y.o. male is here for routine f/u. More sx of palpitations, PAC\"s--had stopped flomax to see if this would reduce, no real change. No prolonged episodes that he is aware of  No chest pain, dyspnea. Continues to run several days/week without difficulty (PAC\"s disappear with exercise). Last stress test 10/17, no recent Echo (or holter). The patient denies chest pain/ shortness of breath, orthopnea, PND, LE edema,  syncope, presyncope or fatigue.        Patient Active Problem List    Diagnosis Date Noted    PVC's (premature ventricular contractions) 07/23/2015    PAC (premature atrial contraction) 07/23/2015    CAD (coronary artery disease) 07/23/2015    S/P CABG (coronary artery bypass graft) 07/23/2015    DM (diabetes mellitus) ([facility] [address] 07/23/2015    Dyslipidemia 07/23/2015      Other, MD Sheridan  Past Medical History:   Diagnosis Date    CAD (coronary artery disease)     [facility]     Dyslipidemia     Endocrine disease     GERD (gastroesophageal reflux disease)     Heart abnormality     Hypothyroidism     Palpitations     S/P CABG (coronary artery bypass graft) 1999    LIMA to LAD, SVT to OM1, OM2, SVG to PDA    Seizure disorder (HCC)     vs syncope w/u 2007 (CT, Echo, carotids, stress    Spinal stenosis     Sun-damaged skin       Past Surgical History:   Procedure Laterality Date    CARDIAC SURG PROCEDURE UNLIST       Allergies   Allergen Reactions    Bactrim [Sulfamethoprim] Other (comments)    Sulfa (Sulfonamide Antibiotics) Rash      Family History   Problem Relation Age of Onset    Diabetes Mother     Stroke Father       Social History     Socioeconomic History    Marital status:      Spouse name: Not on file    Number of children: Not on file    Years of education: Not on file    Highest education level: Not on file   Occupational History    Not on file   Social Needs    Financial resource strain: Not on file   [place] insecurity:     Worry: Not on file     Inability: Not on file    Transportation needs:     Medical: Not on file     Non-medical: Not on file   Tobacco Use    Smoking status: Never Smoker    Smokeless tobacco: Never Used   Substance and Sexual Activity    Alcohol use: Yes     Alcohol/week: 0.5 oz     Types: 1 Glasses of wine per week     Comment: one a day    Drug use: No    Sexual activity: Not on file   Lifestyle    Physical activity:     Days per week: Not on file     Minutes per session: Not on file    Stress: Not on file   Relationships    Social connections:     Talks on phone: Not on file     Gets together: Not on file     Attends Latter-day service: Not on file     Active member of club or organization: Not on file     Attends meetings of clubs or organizations: Not on file     Relationship status: Not on file    Intimate partner violence:     Fear of current or ex partner: Not on file     Emotionally abused: Not on file     Physically abused: Not on file     Forced sexual activity: Not on file   Other Topics Concern    Not on file   Social History Narrative    Not on file      Current Outpatient Medications   Medication Sig    fluocinoNIDE (LIDEX) 0.05 % topical cream Apply  to affected area as needed for Skin Irritation.  omeprazole (PRILOSEC) 40 mg capsule Take 40 mg by mouth every Monday, Wednesday, Friday.  diphenhydrAMINE (BENADRYL) 25 mg capsule Take 25 mg by mouth as needed.  acetaminophen (TYLENOL) 325 mg tablet Take  by mouth as needed for Pain.  sitaGLIPtin (JANUVIA) 50 mg tablet Take 50 mg by mouth daily.  atorvastatin (LIPITOR) 80 mg tablet Take 80 mg by mouth daily.  albuterol (PROVENTIL HFA, VENTOLIN HFA, PROAIR HFA) 90 mcg/actuation inhaler Take  by inhalation as needed for Wheezing.  fluticasone-salmeterol (ADVAIR DISKUS) 250-50 mcg/dose diskus inhaler Take 1 Puff by inhalation as needed.  aspirin 81 mg chewable tablet Take 81 mg by mouth daily.     metFORMIN (GLUCOPHAGE) 500 mg tablet Take  by mouth two (2) times daily (with meals).  levothyroxine (SYNTHROID) 100 mcg tablet Take  by mouth Daily (before breakfast). No current facility-administered medications for this visit. Review of Symptoms:    CONST  No weight change. No fever, chills, sweats    ENT No visual changes, URI sx, sore throat    CV  See HPI   RESP  No cough, or sputum, wheezing. Also see HPI   GI  No abdominal pain or change in bowel habits. No heartburn or dysphagia. No melena or rectal bleeding.   No dysuria, urgency, frequency, hematuria   MSKEL  No joint pain, swelling. No muscle pain. SKIN  No rash or lesions. NEURO  No headache, syncope, or seizure. No weakness, loss of sensation, or paresthesias. PSYCH  No low mood or depression  No anxiety. HE/LYMPH  No easy bruising, abnormal bleeding, or enlarged glands. Physical ExamPhysical Exam:    Visit Vitals  /66 (BP 1 Location: Left arm, BP Patient Position: Sitting)   Pulse 66   Resp 16   Ht 5' 7\" (1.702 m)   Wt 143 lb (64.9 kg)   SpO2 98% Comment: ra   BMI 22.40 kg/m²     Gen: NAD  HEENT:  PERRL, throat clear  Neck: no adenopathy, no thyromegaly, no JVD   Heart:  sl irregular,Nl S1S2,  no murmur, gallop or rub.   Lungs:  clear  Abdomen:   Soft, non-tender, bowel sounds are active.   Extremities:  No edema  Pulse: symmetric  Neuro: A&O times 3, No focal neuro deficits    Cardiographics    ECG: NSR, RBBB, PVC, no acute changes      Assessment:         Patient Active Problem List    Diagnosis Date Noted    PVC's (premature ventricular contractions) 07/23/2015    PAC (premature atrial contraction) 07/23/2015    CAD (coronary artery disease) 07/23/2015    S/P CABG (coronary artery bypass graft) 07/23/2015    [facility]    Dyslipidemia 07/23/2015     More sx of palpitations, PAC\"s--had stopped flomax to see if this would reduce, no real change.   No prolonged episodes that he is aware of  No chest pain, dyspnea. Continues to run several days/week without difficulty (PAC\"s disappear with exercise). Last stress test 10/17, no recent Echo (or holter). Plan:     Echo/doppler--LVEF, atrial size, valves, etc  Holter monitor  Consider low dose beta blocker or diltiazem if needed  Is off flomax--no real change so likely to restart  Lipids and labs followed by PCP. Continue current care and f/u in 6 months.     Soumya Larson MD

## 2019-05-23 ENCOUNTER — TELEPHONE (OUTPATIENT)
Dept: CARDIOLOGY CLINIC | Age: 79
End: 2019-05-23

## 2019-05-23 ENCOUNTER — CLINICAL SUPPORT (OUTPATIENT)
Dept: CARDIOLOGY CLINIC | Age: 79
End: 2019-05-23

## 2019-05-23 DIAGNOSIS — I49.1 PAC (PREMATURE ATRIAL CONTRACTION): ICD-10-CM

## 2019-05-23 DIAGNOSIS — I49.3 PVC'S (PREMATURE VENTRICULAR CONTRACTIONS): ICD-10-CM

## 2019-05-23 NOTE — PROGRESS NOTES
24 hour Holter monitor only. Verified patient with two patient identifiers. Pt verbalized understanding of its use. Ordering PAULY Fraser  Reason: PVC's (premature ventricular contractions) [I49.3 (ICD-10-CM)]; PAC (premature atrial contraction) [I49.1 (ICD-10-CM)]  Start time:  2:41pm  Return date: 5/24/19      No LOS.

## 2019-05-23 NOTE — TELEPHONE ENCOUNTER
----- Message from Halley Ayers MD sent at 5/22/2019  2:17 PM EDT -----  Regarding: echo  Advise echo shows normal LV pumping function (EF 20-20), mild diastolic/relaxation abnormality, trace valve regurg only, minimally elevated pulmonary pressures--no concerns at all.   Thanks [facility]

## 2019-05-24 ENCOUNTER — DOCUMENTATION ONLY (OUTPATIENT)
Dept: CARDIOLOGY CLINIC | Age: 79
End: 2019-05-24

## 2019-05-31 ENCOUNTER — TELEPHONE (OUTPATIENT)
Dept: CARDIOLOGY CLINIC | Age: 79
End: 2019-05-31

## 2019-05-31 NOTE — TELEPHONE ENCOUNTER
----- Message from Aleksandra Fuentes MD sent at 5/29/2019 10:31 AM EDT -----  Regarding: Holter  Frequent PAC's, occasional PVC's--no afib/flutter or other concerns.   Thanks [facility]

## 2019-06-28 ENCOUNTER — PATIENT MESSAGE (OUTPATIENT)
Dept: CARDIOLOGY CLINIC | Age: 79
End: 2019-06-28

## 2019-06-28 NOTE — TELEPHONE ENCOUNTER
From: Kary Durbin  To: Humble Brown MD  Sent: 6/28/2019 7:33 AM EDT  Subject: Test Results Question    What is the result of my recent 24 hr holter monitor? Results do not show up on this website. FYI, I have not had any PACs that I could detect for one month now.

## 2020-02-17 ENCOUNTER — OFFICE VISIT (OUTPATIENT)
Dept: CARDIOLOGY CLINIC | Age: 80
End: 2020-02-17

## 2020-02-17 VITALS
BODY MASS INDEX: 21.97 KG/M2 | SYSTOLIC BLOOD PRESSURE: 122 MMHG | OXYGEN SATURATION: 97 % | HEIGHT: 67 IN | DIASTOLIC BLOOD PRESSURE: 84 MMHG | WEIGHT: 140 LBS | RESPIRATION RATE: 14 BRPM | HEART RATE: 74 BPM

## 2020-02-17 DIAGNOSIS — I49.1 PAC (PREMATURE ATRIAL CONTRACTION): ICD-10-CM

## 2020-02-17 DIAGNOSIS — I49.3 PVC'S (PREMATURE VENTRICULAR CONTRACTIONS): ICD-10-CM

## 2020-02-17 DIAGNOSIS — I25.10 CORONARY ARTERY DISEASE INVOLVING NATIVE CORONARY ARTERY OF NATIVE HEART WITHOUT ANGINA PECTORIS: Primary | ICD-10-CM

## 2020-02-17 DIAGNOSIS — E11.69 TYPE 2 DIABETES MELLITUS WITH OTHER SPECIFIED COMPLICATION, WITHOUT LONG-TERM CURRENT USE OF INSULIN (HCC): ICD-10-CM

## 2020-02-17 DIAGNOSIS — E78.5 DYSLIPIDEMIA: ICD-10-CM

## 2020-02-17 DIAGNOSIS — Z95.1 S/P CABG (CORONARY ARTERY BYPASS GRAFT): ICD-10-CM

## 2020-02-17 NOTE — PROGRESS NOTES
Verified patient with two patient identifiers. Medications reviewed/approved by Dr. Arsenio Ch. 1. Have you been to the ER, urgent care clinic since your last visit? Hospitalized since your last visit?no    2. Have you seen or consulted any other health care providers outside of the [address] [place] [address] since your last visit? Include any pap smears or colon screening. Yes, Dr. Ginny Rosario pcp seen recently for diabetes.

## 2020-02-17 NOTE — PROGRESS NOTES
Henri Hameed is a 78 y.o. male is here for routine f/u. Much less frequent PAC\"s--had stopped flomax to see if this would reduce, no real change, then caffeine. This all has resolved on own. No prolonged episodes that he is aware of  No chest pain, dyspnea. Continues to run several days/week without difficulty (PAC\"s disappear with exercise). Last stress test 10/17 ok. Echo 5/19 with LVEF 22-35, grade I diastolic, trace MR, mild pulm hypertension. Holter 5/19 Frequent PAC's, occasional PVC's--no afib/flutter or other    The patient denies chest pain/ shortness of breath, orthopnea, PND, LE edema,  syncope, presyncope or fatigue.        Patient Active Problem List    Diagnosis Date Noted    PVC's (premature ventricular contractions) 07/23/2015    PAC (premature atrial contraction) 07/23/2015    CAD (coronary artery disease) 07/23/2015    S/P CABG (coronary artery bypass graft) 07/23/2015    DM (diabetes mellitus) ([facility] 07/23/2015    Dyslipidemia 07/23/2015      Other, MD Sheridan  Past Medical History:   Diagnosis Date    CAD (coronary artery disease)     DM [facility]diabetes mellitus) [facility] [address] [facility]     Dyslipidemia     Endocrine disease     GERD (gastroesophageal reflux disease)     Heart abnormality     Hypothyroidism     Palpitations     S/P CABG (coronary artery bypass graft) 1999    LIMA to LAD, SVT to OM1, OM2, SVG to PDA    Seizure disorder (HCC)     vs syncope w/u 2007 (CT, Echo, carotids, stress    Spinal stenosis     Sun-damaged skin       Past Surgical History:   Procedure Laterality Date    CARDIAC SURG PROCEDURE UNLIST       Allergies   Allergen Reactions    Bactrim [Sulfamethoprim] Other (comments)    Sulfa (Sulfonamide Antibiotics) Rash      Family History   Problem Relation Age of Onset    Diabetes Mother     Stroke Father       Social History     Socioeconomic History    Marital status:      Spouse name: Not on file    Number of children: Not on file    Years of education: Not on file    Highest education level: Not on file   Occupational History    Not on file   Social Needs    Financial resource strain: Not on file    Food insecurity:     Worry: Not on file     Inability: Not on file    Transportation needs:     Medical: Not on file     Non-medical: Not on file   Tobacco Use    Smoking status: Never Smoker    Smokeless tobacco: Never Used   Substance and Sexual Activity    Alcohol use: Yes     Alcohol/week: 0.8 standard drinks     Types: 1 Glasses of wine per week     Comment: one a day    Drug use: No    Sexual activity: Not on file   Lifestyle    Physical activity:     Days per week: Not on file     Minutes per session: Not on file    Stress: Not on file   Relationships    Social connections:     Talks on phone: Not on file     Gets together: Not on file     Attends Restorationism service: Not on file     Active member of club or organization: Not on file     Attends meetings of clubs or organizations: Not on file     Relationship status: Not on file    Intimate partner violence:     Fear of current or ex partner: Not on file     Emotionally abused: Not on file     Physically abused: Not on file     Forced sexual activity: Not on file   Other Topics Concern    Not on file   Social History Narrative    Not on file      Current Outpatient Medications   Medication Sig    fluocinoNIDE (LIDEX) 0.05 % topical cream Apply  to affected area as needed for Skin Irritation.  omeprazole (PRILOSEC) 40 mg capsule Take 40 mg by mouth every Monday, Wednesday, Friday.  diphenhydrAMINE (BENADRYL) 25 mg capsule Take 25 mg by mouth as needed.  acetaminophen (TYLENOL) 325 mg tablet Take  by mouth as needed for Pain.  sitaGLIPtin (JANUVIA) 50 mg tablet Take 50 mg by mouth daily.  atorvastatin (LIPITOR) 80 mg tablet Take 80 mg by mouth daily.  albuterol (PROVENTIL HFA, VENTOLIN HFA, PROAIR HFA) 90 mcg/actuation inhaler Take  by inhalation as needed for Wheezing.  fluticasone-salmeterol (ADVAIR DISKUS) 250-50 mcg/dose diskus inhaler Take 1 Puff by inhalation as needed.  aspirin 81 mg chewable tablet Take 81 mg by mouth daily.  metFORMIN (GLUCOPHAGE) 500 mg tablet Take  by mouth two (2) times daily (with meals).  levothyroxine (SYNTHROID) 100 mcg tablet Take  by mouth Daily (before breakfast). No current facility-administered medications for this visit. Review of Symptoms:    CONST  No weight change. No fever, chills, sweats    ENT No visual changes, URI sx, sore throat    CV  See HPI   RESP  No cough, or sputum, wheezing. Also see HPI   GI  No abdominal pain or change in bowel habits. No heartburn or dysphagia. No melena or rectal bleeding.   No dysuria, urgency, frequency, hematuria   MSKEL  No joint pain, swelling. No muscle pain. SKIN  No rash or lesions. NEURO  No headache, syncope, or seizure. No weakness, loss of sensation, or paresthesias. PSYCH  No low mood or depression  No anxiety. HE/LYMPH  No easy bruising, abnormal bleeding, or enlarged glands. Physical ExamPhysical Exam:    There were no vitals taken for this visit. Gen: NAD  HEENT:  PERRL, throat clear  Neck: no adenopathy, no thyromegaly, no JVD   Heart:  Regular,Nl S1S2,  no murmur, gallop or rub.   Lungs:  clear  Abdomen:   Soft, non-tender, bowel sounds are active.   Extremities:  No edema  Pulse: symmetric  Neuro: A&O times 3, No focal neuro deficits    Cardiographics    ECG: NSR, RBBB      Assessment:         Patient Active Problem List    Diagnosis Date Noted    PVC's (premature ventricular contractions) 07/23/2015    PAC (premature atrial contraction) 07/23/2015    CAD (coronary artery disease) 07/23/2015    S/P CABG (coronary artery bypass graft) 07/23/2015    DM (diabetes mellitus) ([facility] [address] 07/23/2015    Dyslipidemia 07/23/2015          Plan:     Doing well with no adverse cardiac symptoms.    DM, lipids well managed by Internist   Lipids and labs followed by PCP. Continue current care and f/u in 6 months.     Miri Oliver MD

## 2021-05-10 ENCOUNTER — OFFICE VISIT (OUTPATIENT)
Dept: CARDIOLOGY CLINIC | Age: 81
End: 2021-05-10
Payer: MEDICARE

## 2021-05-10 VITALS
WEIGHT: 136 LBS | HEIGHT: 67 IN | OXYGEN SATURATION: 98 % | DIASTOLIC BLOOD PRESSURE: 70 MMHG | RESPIRATION RATE: 16 BRPM | SYSTOLIC BLOOD PRESSURE: 110 MMHG | HEART RATE: 56 BPM | TEMPERATURE: 98.3 F | BODY MASS INDEX: 21.35 KG/M2

## 2021-05-10 DIAGNOSIS — I49.1 PAC (PREMATURE ATRIAL CONTRACTION): ICD-10-CM

## 2021-05-10 DIAGNOSIS — Z95.1 S/P CABG (CORONARY ARTERY BYPASS GRAFT): ICD-10-CM

## 2021-05-10 DIAGNOSIS — I49.3 PVC'S (PREMATURE VENTRICULAR CONTRACTIONS): ICD-10-CM

## 2021-05-10 DIAGNOSIS — E78.5 DYSLIPIDEMIA: ICD-10-CM

## 2021-05-10 DIAGNOSIS — E11.69 TYPE 2 DIABETES MELLITUS WITH OTHER SPECIFIED COMPLICATION, WITHOUT LONG-TERM CURRENT USE OF INSULIN (HCC): ICD-10-CM

## 2021-05-10 DIAGNOSIS — I25.10 CORONARY ARTERY DISEASE INVOLVING NATIVE CORONARY ARTERY OF NATIVE HEART WITHOUT ANGINA PECTORIS: Primary | ICD-10-CM

## 2021-05-10 PROCEDURE — G8427 DOCREV CUR MEDS BY ELIG CLIN: HCPCS | Performed by: INTERNAL MEDICINE

## 2021-05-10 PROCEDURE — G8420 CALC BMI NORM PARAMETERS: HCPCS | Performed by: INTERNAL MEDICINE

## 2021-05-10 PROCEDURE — 1101F PT FALLS ASSESS-DOCD LE1/YR: CPT | Performed by: INTERNAL MEDICINE

## 2021-05-10 PROCEDURE — G8510 SCR DEP NEG, NO PLAN REQD: HCPCS | Performed by: INTERNAL MEDICINE

## 2021-05-10 PROCEDURE — 99214 OFFICE O/P EST MOD 30 MIN: CPT | Performed by: INTERNAL MEDICINE

## 2021-05-10 PROCEDURE — G8536 NO DOC ELDER MAL SCRN: HCPCS | Performed by: INTERNAL MEDICINE

## 2021-05-10 PROCEDURE — 93000 ELECTROCARDIOGRAM COMPLETE: CPT | Performed by: INTERNAL MEDICINE

## 2021-05-10 NOTE — PROGRESS NOTES
Muna Steel is a 78 y.o. male is here for routine f/u. Less frequent PAC\"s--had stopped flomax to see if this would reduce, no real change, then caffeine. This all has resolved on own.  No prolonged episodes that he is aware of  No chest pain, dyspnea.  Continues to run several days/week without difficulty (PAC\"s disappear with exercise). Last stress test 10/17 ok. Echo 5/19 with LVEF 17-42, grade I diastolic, trace MR, mild pulm hypertension. Holter 5/19 Frequent PAC's, occasional PVC's--no afib/flutter or other. Has moved to [place] and establishing Cardiology there. The patient denies chest pain/ shortness of breath, orthopnea, PND, LE edema,  syncope, presyncope or fatigue.        Patient Active Problem List    Diagnosis Date Noted    PVC's (premature ventricular contractions) 07/23/2015    PAC (premature atrial contraction) 07/23/2015    CAD (coronary artery disease) 07/23/2015    S/P CABG (coronary artery bypass graft) 07/23/2015    DM (diabetes mellitus) ([facility] [address] 07/23/2015    Dyslipidemia 07/23/2015      Other, MD Sheridan  Past Medical History:   Diagnosis Date    CAD (coronary artery disease)     DM [facility]di[facility] mellitus) [facility]     Dyslipidemia     Endocrine disease     GERD (gastroesophageal reflux disease)     Heart abnormality     Hypothyroidism     Palpitations     S/P CABG (coronary artery bypass graft) 1999    LIMA to LAD, SVT to OM1, OM2, SVG to PDA    Seizure disorder (HCC)     vs syncope w/u 2007 (CT, Echo, carotids, stress    Spinal stenosis     Sun-damaged skin       Past Surgical History:   Procedure Laterality Date    MD CARDIAC SURG PROCEDURE UNLIST       Allergies   Allergen Reactions    Bactrim [Sulfamethoprim] Other (comments)    Sulfa (Sulfonamide Antibiotics) Rash      Family History   Problem Relation Age of Onset    Diabetes Mother     Stroke Father       Social History     Socioeconomic History    Marital status:      Spouse name: Not on file    Number of children: Not on file    Years of education: Not on file    Highest education level: Not on file   Occupational History    Not on file   Social Needs    Financial resource strain: Not on file    Food insecurity     Worry: Not on file     Inability: Not on file    Transportation needs     Medical: Not on file     Non-medical: Not on file   Tobacco Use    Smoking status: Never Smoker    Smokeless tobacco: Never Used   Substance and Sexual Activity    Alcohol use: Yes     Alcohol/week: 0.8 standard drinks     Types: 1 Glasses of wine per week     Comment: one a day    Drug use: No    Sexual activity: Not on file   Lifestyle    Physical activity     Days per week: Not on file     Minutes per session: Not on file    Stress: Not on file   Relationships    Social connections     Talks on phone: Not on file     Gets together: Not on file     Attends Restorationism service: Not on file     Active member of club or organization: Not on file     Attends meetings of clubs or organizations: Not on file     Relationship status: Not on file    Intimate partner violence     Fear of current or ex partner: Not on file     Emotionally abused: Not on file     Physically abused: Not on file     Forced sexual activity: Not on file   Other Topics Concern    Not on file   Social History Narrative    Not on file      Current Outpatient Medications   Medication Sig    empagliflozin (JARDIANCE) 25 mg tablet Take 12.5 mg by mouth daily.  fluocinoNIDE (LIDEX) 0.05 % topical cream Apply  to affected area as needed for Skin Irritation.  omeprazole (PRILOSEC) 40 mg capsule Take 40 mg by mouth as needed.  diphenhydrAMINE (BENADRYL) 25 mg capsule Take 25 mg by mouth as needed.  acetaminophen (TYLENOL) 325 mg tablet Take  by mouth as needed for Pain.  sitaGLIPtin (JANUVIA) 50 mg tablet Take 50 mg by mouth daily.  atorvastatin (LIPITOR) 80 mg tablet Take 40 mg by mouth daily.     albuterol (PROVENTIL HFA, VENTOLIN HFA, PROAIR HFA) 90 mcg/actuation inhaler Take  by inhalation as needed for Wheezing.  fluticasone-salmeterol (ADVAIR DISKUS) 250-50 mcg/dose diskus inhaler Take 1 Puff by inhalation as needed.  aspirin 81 mg chewable tablet Take 81 mg by mouth daily.  metFORMIN (GLUCOPHAGE) 500 mg tablet Take  by mouth two (2) times daily (with meals).  levothyroxine (SYNTHROID) 100 mcg tablet Take  by mouth Daily (before breakfast). No current facility-administered medications for this visit. Review of Symptoms:    CONST  No weight change. No fever, chills, sweats    ENT No visual changes, URI sx, sore throat    CV  See HPI   RESP  No cough, or sputum, wheezing. Also see HPI   GI  No abdominal pain or change in bowel habits. No heartburn or dysphagia. No melena or rectal bleeding.   No dysuria, urgency, frequency, hematuria   MSKEL  No joint pain, swelling. No muscle pain. SKIN  No rash or lesions. NEURO  No headache, syncope, or seizure. No weakness, loss of sensation, or paresthesias. PSYCH  No low mood or depression  No anxiety. HE/LYMPH  No easy bruising, abnormal bleeding, or enlarged glands. Physical ExamPhysical Exam:    Visit Vitals  /70 (BP 1 Location: Left upper arm, BP Patient Position: Sitting, BP Cuff Size: Adult)   Pulse (!) 56   Temp 98.3 °F (36.8 °C) (Temporal)   Resp 16   Ht 5' 7\" (1.702 m)   Wt 136 lb (61.7 kg)   SpO2 98% Comment: RA   BMI 21.30 kg/m²     Gen: NAD  HEENT:  PERRL, throat clear  Neck: no adenopathy, no thyromegaly, no JVD   Heart:  Regular,Nl S1S2,  no murmur, gallop or rub. Lungs:  clear  Abdomen:   Soft, non-tender, bowel sounds are active.    Extremities:  No edema  Pulse: symmetric  Neuro: A&O times 3, No focal neuro deficits    Cardiographics    ECG: NSR, RBBB, no acute changes      Assessment:         Patient Active Problem List    Diagnosis Date Noted    PVC's (premature ventricular contractions) 07/23/2015    PAC (premature atrial contraction) 07/23/2015    CAD (coronary artery disease) 07/23/2015    S/P CABG (coronary artery bypass graft) 07/23/2015    DM (diabetes mellitus) ([facility] 07/23/2015    Dyslipidemia 07/23/2015      Less frequent PAC\"s--had stopped flomax to see if this would reduce, no real change, then caffeine. This all has resolved on own.  No prolonged episodes that he is aware of  No chest pain, dyspnea.  Continues to run several days/week without difficulty (PAC\"s disappear with exercise). Last stress test 10/17 ok. Echo 5/19 with LVEF 41-49, grade I diastolic, trace MR, mild pulm hypertension. Holter 5/19 Frequent PAC's, occasional PVC's--no afib/flutter or other. Has moved to [place] and establishing Cardiology there. Plan:     Doing well with no adverse cardiac symptoms. DM, Lipids and labs followed by PCP.    To fu with Cardiology in [facility], [place]

## 2021-05-10 NOTE — PROGRESS NOTES
Verified patient with two patient identifiers. Medications reviewed/approved by Dr. Obed Alvarado. Chief Complaint   Patient presents with    Coronary Artery Disease     6 month follow up    Irregular Heart Beat    Cholesterol Problem     1. Have you been to the ER, urgent care clinic since your last visit? Hospitalized since your last visit?no    2. Have you seen or consulted any other health care providers outside of the [address] [place] [address] since your last visit? Include any pap smears or colon screening.  no

## 2023-05-16 RX ORDER — OMEPRAZOLE 40 MG/1
40 CAPSULE, DELAYED RELEASE ORAL PRN
COMMUNITY

## 2023-05-16 RX ORDER — ATORVASTATIN CALCIUM 80 MG/1
40 TABLET, FILM COATED ORAL DAILY
COMMUNITY

## 2023-05-16 RX ORDER — FLUTICASONE PROPIONATE AND SALMETEROL 250; 50 UG/1; UG/1
1 POWDER RESPIRATORY (INHALATION) PRN
COMMUNITY

## 2023-05-16 RX ORDER — LEVOTHYROXINE SODIUM 0.1 MG/1
TABLET ORAL
COMMUNITY

## 2023-05-16 RX ORDER — ALBUTEROL SULFATE 90 UG/1
AEROSOL, METERED RESPIRATORY (INHALATION) PRN
COMMUNITY

## 2023-05-16 RX ORDER — ASPIRIN 81 MG/1
81 TABLET, CHEWABLE ORAL DAILY
COMMUNITY

## 2023-05-16 RX ORDER — ACETAMINOPHEN 325 MG/1
TABLET ORAL PRN
COMMUNITY

## 2023-05-16 RX ORDER — DIPHENHYDRAMINE HCL 25 MG
25 CAPSULE ORAL PRN
COMMUNITY